# Patient Record
Sex: MALE | URBAN - METROPOLITAN AREA
[De-identification: names, ages, dates, MRNs, and addresses within clinical notes are randomized per-mention and may not be internally consistent; named-entity substitution may affect disease eponyms.]

---

## 2017-02-06 NOTE — PATIENT DISCUSSION
(H25.13) Age-related nuclear cataract, bilateral - Assesment : Examination revealed moderate senile nuclear cataract. Patient is currently asymptomatic and functioning well. - Plan : Monitor for changes.  RTC 1 year exam/OCT [facility]

## 2017-02-06 NOTE — PATIENT DISCUSSION
(H40.013) Open angle with borderline findings, low risk, bilateral - Assesment : Examination revealed glaucoma suspect. OCT [facility] performed today - Plan : Monitor for changes.   RTC 1 year exam/OCT [facility]

## 2018-03-23 NOTE — PATIENT DISCUSSION
(H40.013) Open angle with borderline findings, low risk, bilateral - Assesment : Examination revealed glaucoma suspect. OCT [facility] performed today INCREASED C/D - Plan : Monitor for changes.   JUNE 24-2/ TENSION CHECK

## 2018-03-23 NOTE — PATIENT DISCUSSION
(H25.13) Age-related nuclear cataract, bilateral - Assesment : Examination revealed moderate senile nuclear cataract. - Plan : Monitor for changes.

## 2019-03-25 NOTE — PATIENT DISCUSSION
(L29.530) Dermatochalasis of left upper eyelid - Assesment : Examination revealed Dermatochalasis - Plan : Monitor for changes. Advised patient to call our office with decreased vision or increased symptoms.

## 2019-03-25 NOTE — PATIENT DISCUSSION
(W86.450) Dermatochalasis of right upper eyelid - Assesment : Examination revealed Dermatochalasis - Plan : Monitor for changes. Advised patient to call our office with decreased vision or increased symptoms.

## 2019-03-25 NOTE — PATIENT DISCUSSION
(H84.428) Vitreous degeneration, bilateral - Assesment : Examination revealed PVD - Plan : Monitor for changes.

## 2019-03-25 NOTE — PATIENT DISCUSSION
(H40.013) Open angle with borderline findings, low risk, bilateral - Assesment : Examination revealed glaucoma suspect. C/D ASYMMETRY  IOP OU WNL TODAY - Plan : Monitor for changes.   1 YEAR EXAM

## 2019-05-14 NOTE — PATIENT DISCUSSION
(R41.061) Vitreous degeneration, bilateral - Assesment : Examination revealed PVD FLOATERS OS. NO TEARS OR DETACHMENTS SEEN TODAY - Plan : Monitor for changes.   KEEP 3/2020 APPT FOR EXAM

## 2019-05-14 NOTE — PATIENT DISCUSSION
(H40.013) Open angle with borderline findings, low risk, bilateral - Assesment : Examination revealed glaucoma suspect. C/D ASYMMETRY  IOP OU WNL TODAY - Plan : Monitor for changes.

## 2019-05-14 NOTE — PATIENT DISCUSSION
(H25.13) Age-related nuclear cataract, bilateral - Assesment : Examination revealed moderate senile nuclear cataract. 2+ [place] WITH CORTICAL SPOKING. - Plan : Monitor for changes.

## 2020-07-24 NOTE — PATIENT DISCUSSION
Briefly discussed package and lens options; patient would like to be less dependent on glasses the majority of the time. Patient may consider ROF IOLs at time of surgery if patient is a candidate. Patient to return for ASCAN/RBAs.

## 2020-08-10 NOTE — PATIENT DISCUSSION
Discussed package and lens options; patient would like to be less dependent on glasses the majority of the time. Patient may consider ROF IOLs at time of surgery.

## 2020-08-10 NOTE — PATIENT DISCUSSION
Patient understands he will need to wear reading glasses for best near vision if patient chooses monofocal lens.

## 2020-12-01 NOTE — PATIENT DISCUSSION
Patient notes intermittent FBS OS>OD. Recommend starting systane Gel drops BID-TID OU, if no improvement use Systane PM at bedtime.

## 2020-12-01 NOTE — PATIENT DISCUSSION
Lid everted, small hair under upper lid. Removed at [facility] ([facility]) without incident.

## 2020-12-28 NOTE — PROCEDURE NOTE: CLINICAL
PROCEDURE NOTE: Punctal Plugs, Quintess Dissolvable ([id number], ) OS. Diagnosis: Keratoconjunctivitis Sicca, Not Specified As Sjögren's. Prior to treatment, the risks/benefits/alternatives were discussed. The patient wished to proceed with procedure. Temporary collagen plugs were inserted. Patient tolerated procedure well. There were no complications. Post procedure instructions given. Klarissa Palacio

## 2020-12-28 NOTE — PATIENT DISCUSSION
Recommend punctal plug LLL to treat LAURYN. R/B/A's discussed. Verbal consent obtained. Placed punctal plugs LLL at slit lamp without incident. Rhomboid Transposition Flap Text: The defect edges were debeveled with a #15 scalpel blade.  Given the location of the defect and the proximity to free margins a rhomboid transposition flap was deemed most appropriate.  Using a sterile surgical marker, an appropriate rhomboid flap was drawn incorporating the defect.    The area thus outlined was incised deep to adipose tissue with a #15 scalpel blade.  The skin margins were undermined to an appropriate distance in all directions utilizing iris scissors.

## 2021-02-24 NOTE — PROCEDURE NOTE: CLINICAL
PROCEDURE NOTE: Punctal Plugs, Quintess Dissolvable ([id number], ) OS. Diagnosis: Keratoconjunctivitis Sicca, Not Specified As Sjögren's. Prior to treatment, the risks/benefits/alternatives were discussed. The patient wished to proceed with procedure. Verbal consent obtained. Temporary collagen plugs were inserted FREDI. Patient tolerated procedure well. There were no complications. Post procedure instructions given. Santhosh Schaffer

## 2021-02-24 NOTE — PATIENT DISCUSSION
Pt reports to Dr. Pradeep Soto, feels he losing his peripheral vision. Recommend VF 30/2 for further evaluation .

## 2021-03-22 NOTE — PATIENT DISCUSSION
Patient to consider further evaluation with Neuro ophthalmology regarding decline of peripheral VA OS.

## 2021-03-22 NOTE — PATIENT DISCUSSION
VF OS continues to show generalized constriction on repeat testing, slightly improved since last visit.

## 2021-05-12 ENCOUNTER — IMPORTED ENCOUNTER (OUTPATIENT)
Dept: URBAN - METROPOLITAN AREA CLINIC 1 | Facility: CLINIC | Age: 66
End: 2021-05-12

## 2021-05-12 PROBLEM — H00.14: Noted: 2021-05-12

## 2021-05-12 PROBLEM — H25.13: Noted: 2021-05-12

## 2021-05-12 PROBLEM — H52.4: Noted: 2021-05-12

## 2021-05-12 PROCEDURE — 92004 COMPRE OPH EXAM NEW PT 1/>: CPT

## 2021-05-12 PROCEDURE — 92015 DETERMINE REFRACTIVE STATE: CPT

## 2021-05-12 NOTE — PATIENT DISCUSSION
1.  Cataract OU: Observe for now without intervention. The patient was advised to contact us if any change or worsening of vision2. Chalazion left upper eyelid -Hot compresses TID x 5 minutes  BeginTobradex TID OS3. Presbyopia: Rx was given for corrective spectacles if indicated. 4.  Return for an appointment in 2 weeks for 10 with Dr. Mendez Lima.

## 2021-06-07 NOTE — PATIENT DISCUSSION
Patient had evaluation with Neuro ophthalmology, notes not received, patient stated no significant findings and bloodwork was recommended which he did not complete.

## 2021-06-07 NOTE — PATIENT DISCUSSION
Patient states he has tried Refresh gtts through out the day but did not find improvement with refresh. Patient also tried gel QHS without relief.

## 2021-06-07 NOTE — PATIENT DISCUSSION
Symptoms appear to be consistent with surface irritation, no evidence of corneal staining or foreign on exam today.

## 2021-06-07 NOTE — PATIENT DISCUSSION
Will consider trying Lupe 128 ointment if no relief from Prolensa, consider trial of bandage contact lens.

## 2021-10-04 NOTE — PATIENT DISCUSSION
Internet was down during this patients exam and we were unable to consent him for YAG ls.  Please consent him day of YAG Ls.

## 2021-10-28 NOTE — PATIENT DISCUSSION
Patient had evaluation with Neuro ophthalmology, notes not received, patient stated no significant findings and bloodwork was recommended which he did not complete. Pt states that he lives with his wife in an apartment with 3 flights of stairs to enter from the outside. Prior to hospitalization pt was independent with all functional mobility with no DME use. Pt states "a HHA comes in the morning and walks outside with me."

## 2021-10-28 NOTE — PATIENT DISCUSSION
Advised pt it is unlikely for the drop to have caused these symptoms. Symptoms would have resolve by now.

## 2021-10-28 NOTE — PATIENT DISCUSSION
"Reiterated recommendation of artificial tears 3-4 times daily OS to try to alleviate ""soreness"" . "

## 2021-10-28 NOTE — PATIENT DISCUSSION
Pt reports to Magalys Spencer, he felt the film in [place] and soreness started with PMN drop used at PO after CE.

## 2021-12-16 NOTE — PATIENT DISCUSSION
DISCUSSED THE DX, IMAGES, PROGNOSIS AND TX PLAN. PT REQUIRES SX, RISK OF VI: MOD/HIGH. ALL QUESTIONS WERE ANSWERED.

## 2021-12-16 NOTE — PATIENT DISCUSSION
Dry ARMD is responsible for some decrease in vision. PARAFOVEAL FLUID IN OS, CONCERNING FOR PERIPAPILLARY CNV. CONT TO MONITOR CLOSELY.

## 2022-01-25 NOTE — PATIENT DISCUSSION
1/25/22: RECOMMEND WARM COMPRESSES, LID HYGIENE (INSTRUCTIONS GIVEN). WE'LL ADD MAXITROL OINTMENT QID FOR 1 WK FOR IMPROVEMENT.

## 2022-03-19 PROBLEM — M16.11 OSTEOARTHRITIS OF RIGHT HIP: Status: ACTIVE | Noted: 2018-10-31

## 2022-04-02 ASSESSMENT — KERATOMETRY
OD_K1POWER_DIOPTERS: 44.00
OS_AXISANGLE2_DEGREES: 112
OD_AXISANGLE2_DEGREES: 090
OS_AXISANGLE_DEGREES: 022
OD_AXISANGLE_DEGREES: 180
OS_K1POWER_DIOPTERS: 43.25
OD_K2POWER_DIOPTERS: 44.00
OS_K2POWER_DIOPTERS: 44.00

## 2022-04-02 ASSESSMENT — VISUAL ACUITY
OD_CC: 20/50
OS_CC: 20/60
OD_GLARE: 20/400
OS_GLARE: 20/400

## 2022-04-02 ASSESSMENT — TONOMETRY
OS_IOP_MMHG: 17
OD_IOP_MMHG: 18

## 2022-10-17 NOTE — PATIENT DISCUSSION
"Av Uriarte (Kaden) was seen and treated in our emergency department on 5/1/2022.     COVID-19 is present in our communities across the state. There is limited testing for COVID at this time, so not all patients can be tested. In this situation, your employee meets the following criteria:    Av Uriarte has met the criteria for COVID-19 testing based upon symptoms, travel, and/or potential exposure. The test has been completed and is pending results at this time. During this time the employee is not able to work and should be quarantined per the Centers for Disease Control timelines.     If you have any questions or concerns, or if I can be of further assistance, please do not hesitate to contact me.    Sincerely,             Sol Mccloud PA-C" MRI does not show any mass or tumor. (+) aging changes.

## 2023-09-26 NOTE — PATIENT DISCUSSION
Patient understands he will need to wear reading glasses for best near vision if patient chooses monofocal lens. Cephalexin Counseling: I counseled the patient regarding use of cephalexin as an antibiotic for prophylactic and/or therapeutic purposes. Cephalexin (commonly prescribed under brand name Keflex) is a cephalosporin antibiotic which is active against numerous classes of bacteria, including most skin bacteria. Side effects may include nausea, diarrhea, gastrointestinal upset, rash, hives, yeast infections, and in rare cases, hepatitis, kidney disease, seizures, fever, confusion, neurologic symptoms, and others. Patients with severe allergies to penicillin medications are cautioned that there is about a 10% incidence of cross-reactivity with cephalosporins. When possible, patients with penicillin allergies should use alternatives to cephalosporins for antibiotic therapy.